# Patient Record
Sex: MALE | Race: BLACK OR AFRICAN AMERICAN | Employment: FULL TIME | URBAN - METROPOLITAN AREA
[De-identification: names, ages, dates, MRNs, and addresses within clinical notes are randomized per-mention and may not be internally consistent; named-entity substitution may affect disease eponyms.]

---

## 2018-01-25 ENCOUNTER — HOSPITAL ENCOUNTER (EMERGENCY)
Age: 28
Discharge: HOME | End: 2018-01-25
Attending: EMERGENCY MEDICINE
Payer: MEDICAID

## 2018-01-25 VITALS
HEART RATE: 70 BPM | RESPIRATION RATE: 18 BRPM | OXYGEN SATURATION: 100 % | TEMPERATURE: 98.9 F | WEIGHT: 185 LBS | BODY MASS INDEX: 26.48 KG/M2 | HEIGHT: 70 IN | SYSTOLIC BLOOD PRESSURE: 139 MMHG | DIASTOLIC BLOOD PRESSURE: 82 MMHG

## 2018-01-25 DIAGNOSIS — G89.18 POSTOPERATIVE PAIN: Primary | ICD-10-CM

## 2018-01-25 PROCEDURE — 99282 EMERGENCY DEPT VISIT SF MDM: CPT

## 2018-01-25 NOTE — ED TRIAGE NOTES
Pt arrives from home c/o bleeding and pain post tonsillectomy. Pt reports the procedure was performed on Friday. Pt states that he has been unable to eat or drink anything d/t pain.

## 2018-01-25 NOTE — DISCHARGE INSTRUCTIONS
Tonsillectomy: What to Expect at Home  Your Recovery  A tonsillectomy is surgery to remove the tonsils. Sometimes the adenoids are removed during the same surgery. The tonsils and adenoids are in the throat. Your doctor did the surgery through your mouth. Most adults have a lot of throat pain for 1 to 2 weeks or longer. The pain may get worse before it gets better. The pain in your throat can also make your ears hurt. You may have good days and bad days. Most people find that they have the most pain in the first 8 days. You probably will feel tired for 1 to 2 weeks. You may have bad breath for up to 2 weeks. You may be able to go back to work or your usual routine in 1 to 2 weeks. There will be a white coating in your throat where the tonsils were. The coating is like a scab, and it usually starts to come off in 5 to 10 days. It is usually gone in 10 to 16 days. You may see some blood in your spit as the coating comes off. After surgery, you may snore or breathe through your mouth at night. This usually gets better 1 to 2 weeks after surgery. Mouth breathing can make your mouth and throat dry or sore. Place a humidifier by your bed when you sleep. This may make it easier for you to breathe. Follow the directions for cleaning the machine. At first, your voice may sound different. Your voice probably will return to normal in 2 to 6 weeks. It is common for people to lose weight after this surgery, because it can hurt to swallow food at first. As long as you are drinking plenty of liquids, this is okay. You will probably gain the weight back when you begin to eat normally again. This care sheet gives you a general idea about how long it will take for you to recover. But each person recovers at a different pace. Follow the steps below to get better as quickly as possible. How can you care for yourself at home? Activity  ? · Rest when you feel tired. Getting enough sleep will help you recover.    ? · Try to walk each day. Start by walking a little more than you did the day before. Bit by bit, increase the amount you walk. Walking boosts blood flow and helps prevent pneumonia and constipation. ? · Avoid strenuous activities, such as bicycle riding, jogging, weight lifting, or aerobic exercise, for 2 weeks or until your doctor says it is okay. ? · For 2 weeks, avoid lifting anything that would make you strain. This may include a child, heavy grocery bags and milk containers, a heavy briefcase or backpack, cat litter or dog food bags, or a vacuum . ? · Avoid dirt, dust, and heat for 2 weeks after surgery. These things can irritate your throat. ? · For about 1 week, try to avoid crowds or people who you know have a cold or the flu. This can help prevent you from getting an infection. ? · You may bathe as usual.   ? · Ask your doctor when you can drive again. ? · You will probably need to take 1 to 2 weeks off from work. It depends on the type of work you do and how you feel. Diet  ? · Drink plenty of fluids to avoid becoming dehydrated. ? · If it is painful to swallow, start out with Popsicles, ice cream, or cold or room-temperature drinks. Do not eat or drink red food items, such as red juice or red Jell-O. The color may make you think you are bleeding. Avoid hot drinks, soda pop, orange or tomato juice, and other acidic foods that can sting the throat. These may make throat pain worse and cause bleeding. ? · For 2 weeks, choose soft foods like pudding, yogurt, canned or cooked fruit, scrambled eggs, and mashed potatoes. Avoid eating hard or scratchy foods like chips or raw vegetables. ? · You may notice that your bowel movements are not regular right after your surgery. This is common. Try to avoid constipation and straining with bowel movements. You may want to take a fiber supplement every day.  If you have not had a bowel movement after a couple of days, ask your doctor about taking a mild laxative. Medicines  ? · Your doctor will tell you if and when you can restart your medicines. He or she will also give you instructions about taking any new medicines. ? · If you take blood thinners, such as warfarin (Coumadin), clopidogrel (Plavix), or aspirin, be sure to talk to your doctor. He or she will tell you if and when to start taking those medicines again. Make sure that you understand exactly what your doctor wants you to do. ? · Be safe with medicines. Take pain medicines exactly as directed. ¨ If the doctor gave you a prescription medicine for pain, take it as prescribed. ¨ If you are not taking a prescription pain medicine, ask your doctor if you can take an over-the-counter medicine. ? · If you think your pain medicine is making you sick to your stomach:  ¨ Take your pain medicine after meals (unless your doctor has told you not to). ¨ Ask your doctor for a different pain medicine. ? · If your doctor prescribed antibiotics, take them as directed. Do not stop taking them just because you feel better. You need to take the full course of antibiotics. Follow-up care is a key part of your treatment and safety. Be sure to make and go to all appointments, and call your doctor if you are having problems. It's also a good idea to know your test results and keep a list of the medicines you take. When should you call for help? Call 911 anytime you think you may need emergency care. For example, call if:  ? · You passed out (lost consciousness). ? · You have severe trouble breathing. ? · You have a lot of bleeding. ?Call your doctor now or seek immediate medical care if:  ? · You have signs of infection, such as:  ¨ Increased pain, swelling, warmth, or redness. ¨ Red streaks leading from the area. ¨ Pus draining from the area. ¨ A fever. ? · You are bleeding. ? · You are too sick to your stomach to drink any fluids. ? · You cannot keep down fluids.    ? · You have new pain, or your pain gets worse. ? Watch closely for changes in your health, and be sure to contact your doctor if:  ? · You do not get better as expected. Where can you learn more? Go to http://maryse-alma.info/. Enter I824 in the search box to learn more about \"Tonsillectomy: What to Expect at Home. \"  Current as of: May 12, 2017  Content Version: 11.4  © 9349-6030 [employer], [employer]. Care instructions adapted under license by [employer] (which disclaims liability or warranty for this information). If you have questions about a medical condition or this instruction, always ask your healthcare professional. [address] any warranty or liability for your use of this information.

## 2018-01-25 NOTE — ED PROVIDER NOTES
HPI Comments: 28 y.o. male with no significant past medical history who presents ambulatory from home with chief complaint of post-operative throat pain. Pt underwent tonsillectomy on 1/19/18 and presents today with persistent \"extreme\" post-op pain with swallowing and bilateral ear pain. Pt arrives with an empty bottle of hydrocodone 7.5-325 per 15 mL that pt states he has been using as directed - 30 mL Q4 hrs - was given 473 ML. Additionally, pt has been using magic mouthwash, lidocaine and ibuprofen with minimal relief - out of magic mouthwash. Pt claims he called his ENT's office today and left a message with no response. Pt reports difficulty tolerating PO intake secondary to \"pain when (he) swallows. \" Pt last urinated earlier this morning. Pt denies abx therapy. Pt specifically denies vomiting, hematemesis and fever. There are no other acute medical concerns at this time. Old Chart Review:  Pt was evaluated at [facility] ED on 3/17/2014 with pharyngitis and otitis media. Social hx: current every day tobacco smoker; occasional EtOH use; denies illicit drug use  PCP: None  ENT: Aida Castañeda MD [facility] [place] [facility])    Note written by Joseph De La Paz, as dictated by Sona Thompson, DO 6:40 PM        The history is provided by the patient. No  was used. History reviewed. No pertinent past medical history. History reviewed. No pertinent surgical history. History reviewed. No pertinent family history. Social History     Social History    Marital status: SINGLE     Spouse name: N/A    Number of children: N/A    Years of education: N/A     Occupational History    Not on file.      Social History Main Topics    Smoking status: Current Every Day Smoker    Smokeless tobacco: Never Used    Alcohol use No      Comment: occasional    Drug use: No    Sexual activity: Not on file     Other Topics Concern    Not on file     Social History Narrative         ALLERGIES: Review of patient's allergies indicates no known allergies. Review of Systems   Constitutional: Negative for fever. HENT: Positive for ear pain (bilateral), sore throat and trouble swallowing. Gastrointestinal: Negative for nausea and vomiting. Genitourinary: Positive for decreased urine volume. All other systems reviewed and are negative. Vitals:    01/25/18 1745   BP: 139/82   Pulse: 70   Resp: 18   Temp: 98.9 °F (37.2 °C)   SpO2: 100%   Weight: 83.9 kg (185 lb)   Height: 5' 10\" (1.778 m)            Physical Exam   Nursing note and vitals reviewed. Constitutional: Pt is awake and alert. Pt appears well-developed and well-nourished. NAD. HENT:   Head: Normocephalic and atraumatic. Nose: Nose normal.   Mouth/Throat: Uvula midline. Tonsillar crypts coated with whitish exudate c/w post-tonsillectomy. Ears: TM normal bilaterally. Eyes: Conjunctivae and extraocular motions are normal. Pupils are equal, round, and reactive to light. Right eye exhibits no discharge. Left eye exhibits no discharge. No scleral icterus. Neck: No tracheal deviation present. Supple neck. Cardiovascular: Normal rate, regular rhythm, normal heart sounds and intact distal pulses. Exam reveals no gallop and no friction rub. No murmur heard. Pulmonary/Chest: Effort normal and breath sounds normal. Pt has no wheezes. Pt has no rales. Abdominal: Soft. Pt exhibits no distension and no mass. No tenderness. Pt has no rebound and no guarding. Musculoskeletal: Pt exhibits no edema and no tenderness. Ext: Normal ROM in all four extremities; not tender to palpation; distal pulses are normal, no edema. Neurological: Pt is alert. Nonfocal neuro exam.  Skin: Skin is warm and dry. Pt is not diaphoretic. Psychiatric: Pt has a normal mood and affect. Behavior is normal.   Note written by Joseph Durant, as dictated by Chana Tinoco DO 6:40 PM     [facility]  ED Course       Procedures    7:00 PM  Post-tonsillectomy pain. Discussed available lab and imaging results with patient. He verbalizes understanding and agrees with care plan. Will discharge patient home with specific return precautions; prescription Lortab Elixir and magic mouthwash; f/u with ENT tomorrow. Discussed at length that post-operative pain control is managed by the surgeon. He agrees to f/u tomorrow with ENT (Dr. Cameron Serrano) to discuss persistent pain and will obtain further pain medication as needed from ENT service.

## 2021-03-06 ENCOUNTER — APPOINTMENT (OUTPATIENT)
Dept: GENERAL RADIOLOGY | Age: 31
End: 2021-03-06
Attending: PHYSICIAN ASSISTANT
Payer: MEDICAID

## 2021-03-06 ENCOUNTER — HOSPITAL ENCOUNTER (EMERGENCY)
Age: 31
Discharge: HOME | End: 2021-03-06
Attending: EMERGENCY MEDICINE
Payer: MEDICAID

## 2021-03-06 VITALS
BODY MASS INDEX: 26.48 KG/M2 | TEMPERATURE: 98 F | SYSTOLIC BLOOD PRESSURE: 132 MMHG | HEART RATE: 74 BPM | DIASTOLIC BLOOD PRESSURE: 90 MMHG | HEIGHT: 70 IN | RESPIRATION RATE: 14 BRPM | OXYGEN SATURATION: 95 % | WEIGHT: 185 LBS

## 2021-03-06 DIAGNOSIS — R07.89 ATYPICAL CHEST PAIN: Primary | ICD-10-CM

## 2021-03-06 DIAGNOSIS — M79.601 PAIN OF RIGHT UPPER EXTREMITY: ICD-10-CM

## 2021-03-06 LAB
ALBUMIN SERPL-MCNC: 3.8 G/DL (ref 3.5–5)
ALBUMIN/GLOB SERPL: 1.2 {RATIO} (ref 1.1–2.2)
ALP SERPL-CCNC: 59 U/L (ref 45–117)
ALT SERPL-CCNC: 39 U/L (ref 12–78)
ANION GAP SERPL CALC-SCNC: 8 MMOL/L (ref 5–15)
AST SERPL-CCNC: 24 U/L (ref 15–37)
BASOPHILS # BLD: 0 K/UL (ref 0–0.1)
BASOPHILS NFR BLD: 0 % (ref 0–1)
BILIRUB SERPL-MCNC: 0.4 MG/DL (ref 0.2–1)
BUN SERPL-MCNC: 12 MG/DL (ref 6–20)
BUN/CREAT SERPL: 9 (ref 12–20)
CALCIUM SERPL-MCNC: 8.7 MG/DL (ref 8.5–10.1)
CHLORIDE SERPL-SCNC: 107 MMOL/L (ref 97–108)
CK MB CFR SERPL CALC: 0.6 % (ref 0–2.5)
CK MB SERPL-MCNC: 2.9 NG/ML (ref 5–25)
CK SERPL-CCNC: 510 U/L (ref 39–308)
CO2 SERPL-SCNC: 27 MMOL/L (ref 21–32)
CREAT SERPL-MCNC: 1.28 MG/DL (ref 0.7–1.3)
D DIMER PPP FEU-MCNC: <0.19 MG/L FEU (ref 0–0.65)
DIFFERENTIAL METHOD BLD: NORMAL
EOSINOPHIL # BLD: 0.1 K/UL (ref 0–0.4)
EOSINOPHIL NFR BLD: 1 % (ref 0–7)
ERYTHROCYTE [DISTWIDTH] IN BLOOD BY AUTOMATED COUNT: 12.9 % (ref 11.5–14.5)
GLOBULIN SER CALC-MCNC: 3.1 G/DL (ref 2–4)
GLUCOSE SERPL-MCNC: 112 MG/DL (ref 65–100)
HCT VFR BLD AUTO: 40.2 % (ref 36.6–50.3)
HGB BLD-MCNC: 13.3 G/DL (ref 12.1–17)
IMM GRANULOCYTES # BLD AUTO: 0 K/UL (ref 0–0.04)
IMM GRANULOCYTES NFR BLD AUTO: 0 % (ref 0–0.5)
LYMPHOCYTES # BLD: 1.4 K/UL (ref 0.8–3.5)
LYMPHOCYTES NFR BLD: 30 % (ref 12–49)
MCH RBC QN AUTO: 31.7 PG (ref 26–34)
MCHC RBC AUTO-ENTMCNC: 33.1 G/DL (ref 30–36.5)
MCV RBC AUTO: 95.9 FL (ref 80–99)
MONOCYTES # BLD: 0.6 K/UL (ref 0–1)
MONOCYTES NFR BLD: 12 % (ref 5–13)
NEUTS SEG # BLD: 2.6 K/UL (ref 1.8–8)
NEUTS SEG NFR BLD: 57 % (ref 32–75)
NRBC # BLD: 0 K/UL (ref 0–0.01)
NRBC BLD-RTO: 0 PER 100 WBC
PLATELET # BLD AUTO: 166 K/UL (ref 150–400)
PMV BLD AUTO: 11 FL (ref 8.9–12.9)
POTASSIUM SERPL-SCNC: 3.6 MMOL/L (ref 3.5–5.1)
PROT SERPL-MCNC: 6.9 G/DL (ref 6.4–8.2)
RBC # BLD AUTO: 4.19 M/UL (ref 4.1–5.7)
SODIUM SERPL-SCNC: 142 MMOL/L (ref 136–145)
TROPONIN I SERPL-MCNC: <0.05 NG/ML
WBC # BLD AUTO: 4.6 K/UL (ref 4.1–11.1)

## 2021-03-06 PROCEDURE — 80053 COMPREHEN METABOLIC PANEL: CPT

## 2021-03-06 PROCEDURE — 99284 EMERGENCY DEPT VISIT MOD MDM: CPT

## 2021-03-06 PROCEDURE — 74011250636 HC RX REV CODE- 250/636: Performed by: PHYSICIAN ASSISTANT

## 2021-03-06 PROCEDURE — 93005 ELECTROCARDIOGRAM TRACING: CPT

## 2021-03-06 PROCEDURE — 82550 ASSAY OF CK (CPK): CPT

## 2021-03-06 PROCEDURE — 82553 CREATINE MB FRACTION: CPT

## 2021-03-06 PROCEDURE — 85025 COMPLETE CBC W/AUTO DIFF WBC: CPT

## 2021-03-06 PROCEDURE — 96374 THER/PROPH/DIAG INJ IV PUSH: CPT

## 2021-03-06 PROCEDURE — 36415 COLL VENOUS BLD VENIPUNCTURE: CPT

## 2021-03-06 PROCEDURE — 71045 X-RAY EXAM CHEST 1 VIEW: CPT

## 2021-03-06 PROCEDURE — 84484 ASSAY OF TROPONIN QUANT: CPT

## 2021-03-06 PROCEDURE — 85379 FIBRIN DEGRADATION QUANT: CPT

## 2021-03-06 RX ORDER — CLOPIDOGREL BISULFATE 75 MG/1
75 TABLET ORAL
COMMUNITY

## 2021-03-06 RX ORDER — DILTIAZEM HYDROCHLORIDE 240 MG/1
240 CAPSULE, EXTENDED RELEASE ORAL DAILY
COMMUNITY

## 2021-03-06 RX ORDER — ATORVASTATIN CALCIUM 40 MG/1
40 TABLET, FILM COATED ORAL DAILY
COMMUNITY

## 2021-03-06 RX ORDER — KETOROLAC TROMETHAMINE 30 MG/ML
30 INJECTION, SOLUTION INTRAMUSCULAR; INTRAVENOUS ONCE
Status: COMPLETED | OUTPATIENT
Start: 2021-03-06 | End: 2021-03-06

## 2021-03-06 RX ORDER — GUAIFENESIN 100 MG/5ML
81 LIQUID (ML) ORAL DAILY
COMMUNITY

## 2021-03-06 RX ADMIN — SODIUM CHLORIDE 1000 ML: 900 INJECTION, SOLUTION INTRAVENOUS at 15:22

## 2021-03-06 RX ADMIN — KETOROLAC TROMETHAMINE 30 MG: 30 INJECTION, SOLUTION INTRAMUSCULAR; INTRAVENOUS at 16:09

## 2021-03-06 NOTE — ED NOTES
Pt presents to ED ambulatory complaining of intermittent right-sided chest pain and right arm pain x 3 days. Pt reports past MI in October 2020. Pt reports pain worsens with breathing. Pt is alert and oriented x 4, RR even and unlabored, skin is warm and dry. Assessment completed and pt updated on plan of care.  Call bell in reach.       Emergency Department Nursing Plan of Care       The Nursing Plan of Care is developed from the Nursing assessment and Emergency Department Attending provider initial evaluation.  The plan of care may be reviewed in the “ED Provider note”.    The Plan of Care was developed with the following considerations:   Patient / Family readiness to learn indicated by:verbalized understanding  Persons(s) to be included in education: patient  Barriers to Learning/Limitations:No    Signed     Bessie San V    3/6/2021   2:19 PM

## 2021-03-06 NOTE — ED NOTES
Patient resting in bed comfortably and in no acute distress. IV fluids infusing with out difficulty.

## 2021-03-06 NOTE — ED NOTES
Patient (s) 1 given copy of dc instructions and 0 paper script(s) and  electronic scripts. Patient (s)  verbalized understanding of instructions and script (s). Patient given a current medication reconciliation form and verbalized understanding of their medications. Patient (s)0 verbalized understanding of the importance of discussing medications with  his or her physician or clinic they will be following up with. Patient alert and oriented and in no acute distress.

## 2021-03-08 LAB
ATRIAL RATE: 71 BPM
CALCULATED P AXIS, ECG09: 64 DEGREES
CALCULATED R AXIS, ECG10: 59 DEGREES
CALCULATED T AXIS, ECG11: 18 DEGREES
DIAGNOSIS, 93000: NORMAL
P-R INTERVAL, ECG05: 158 MS
Q-T INTERVAL, ECG07: 382 MS
QRS DURATION, ECG06: 94 MS
QTC CALCULATION (BEZET), ECG08: 415 MS
VENTRICULAR RATE, ECG03: 71 BPM

## 2021-07-19 ENCOUNTER — OFFICE VISIT (OUTPATIENT)
Dept: URGENT CARE | Age: 31
End: 2021-07-19
Payer: MEDICAID

## 2021-07-19 VITALS — TEMPERATURE: 97.7 F | OXYGEN SATURATION: 97 % | RESPIRATION RATE: 18 BRPM | HEART RATE: 76 BPM

## 2021-07-19 DIAGNOSIS — Z20.822 EXPOSURE TO COVID-19 VIRUS: Primary | ICD-10-CM

## 2021-07-19 PROCEDURE — 99212 OFFICE O/P EST SF 10 MIN: CPT | Performed by: FAMILY MEDICINE

## 2021-07-21 LAB
SARS-COV-2, NAA 2 DAY TAT: NORMAL
SARS-COV-2, NAA: NOT DETECTED

## 2021-08-10 ENCOUNTER — HOSPITAL ENCOUNTER (EMERGENCY)
Age: 31
Discharge: STILL ADMITTED OR ADMINISTRATIVE | End: 2021-08-10

## 2023-05-25 RX ORDER — CLOPIDOGREL BISULFATE 75 MG/1
75 TABLET ORAL
COMMUNITY

## 2023-05-25 RX ORDER — DILTIAZEM HYDROCHLORIDE 240 MG/1
240 CAPSULE, EXTENDED RELEASE ORAL DAILY
COMMUNITY

## 2023-05-25 RX ORDER — ATORVASTATIN CALCIUM 40 MG/1
40 TABLET, FILM COATED ORAL DAILY
COMMUNITY

## 2023-05-25 RX ORDER — ASPIRIN 81 MG/1
81 TABLET, CHEWABLE ORAL DAILY
COMMUNITY

## 2023-05-25 RX ORDER — IBUPROFEN 600 MG/1
600 TABLET ORAL EVERY 6 HOURS PRN
COMMUNITY
Start: 2014-03-17

## 2025-08-23 ENCOUNTER — HOSPITAL ENCOUNTER (EMERGENCY)
Facility: HOSPITAL | Age: 35
Discharge: LEFT BEFORE DISCHARGE | End: 2025-08-23
Attending: STUDENT IN AN ORGANIZED HEALTH CARE EDUCATION/TRAINING PROGRAM
Payer: MEDICAID

## 2025-08-23 VITALS
TEMPERATURE: 98.2 F | DIASTOLIC BLOOD PRESSURE: 113 MMHG | RESPIRATION RATE: 22 BRPM | BODY MASS INDEX: 25.8 KG/M2 | HEIGHT: 71 IN | WEIGHT: 184.3 LBS | SYSTOLIC BLOOD PRESSURE: 149 MMHG

## 2025-08-23 PROCEDURE — 99281 EMR DPT VST MAYX REQ PHY/QHP: CPT

## 2025-08-23 PROCEDURE — 4500000002 HC ER NO CHARGE

## 2025-08-23 ASSESSMENT — LIFESTYLE VARIABLES
HOW OFTEN DO YOU HAVE A DRINK CONTAINING ALCOHOL: MONTHLY OR LESS
HOW MANY STANDARD DRINKS CONTAINING ALCOHOL DO YOU HAVE ON A TYPICAL DAY: 1 OR 2

## 2025-08-23 ASSESSMENT — PAIN SCALES - GENERAL: PAINLEVEL_OUTOF10: 10
